# Patient Record
Sex: FEMALE | Race: WHITE
[De-identification: names, ages, dates, MRNs, and addresses within clinical notes are randomized per-mention and may not be internally consistent; named-entity substitution may affect disease eponyms.]

---

## 2020-06-11 ENCOUNTER — HOSPITAL ENCOUNTER (OUTPATIENT)
Dept: HOSPITAL 53 - M SLEEP HO | Age: 30
End: 2020-06-11
Attending: PHYSICIAN ASSISTANT
Payer: COMMERCIAL

## 2020-06-11 DIAGNOSIS — G47.00: ICD-10-CM

## 2020-06-11 DIAGNOSIS — R53.83: Primary | ICD-10-CM

## 2020-06-17 NOTE — SLEEPHOME
DATE OF PROCEDURE:  06/11/2020

 

ORDERED BY:  Ava Friend PA-C

 

Diagnostic home sleep testing was performed due to concern for the obstructive

sleep apnea syndrome in this patient with fatigue.

 

For testing a nocturnal T3 respiratory monitoring device was used.  Continuous

record was made of pulse, oxygen saturation, airflow, chest and abdominal strain,

and body position.

 

9 hours and 59 minutes of data were reviewed.  There were 8 hours and 59 minutes

marked as time in bed.  During the interval marked time in bed, there were 63

respiratory events identified of 10 seconds in duration or greater for a

respiratory event index of 70.  The events were not exclusive to any particular

sleep posture.  They were primarily obstructive.  Baseline pulse rate 75.  Pulse

rate ranged .  Baseline saturation was 95%.  Saturations fell to 90%

surrounding respiratory events.  Testing was performed in both the supine and

nonsupine positions.

 

IMPRESSION:

Abnormal home sleep testing with repetitive respiratory events and oxygen

desaturations to 90% with a respiratory event index of 7 is consistent with the

obstructive sleep apnea syndrome.

 

RECOMMENDATIONS:

The patient should be encouraged to undergo formal sleep evaluation.

## 2020-07-28 ENCOUNTER — HOSPITAL ENCOUNTER (EMERGENCY)
Dept: HOSPITAL 53 - M ED | Age: 30
Discharge: HOME | End: 2020-07-28
Payer: COMMERCIAL

## 2020-07-28 DIAGNOSIS — F41.9: ICD-10-CM

## 2020-07-28 DIAGNOSIS — Z97.5: ICD-10-CM

## 2020-07-28 DIAGNOSIS — Z79.899: ICD-10-CM

## 2020-07-28 DIAGNOSIS — R51: Primary | ICD-10-CM

## 2020-07-28 DIAGNOSIS — L30.9: ICD-10-CM

## 2020-09-18 NOTE — REP
CT OF THE HEAD WITHOUT CONTRAST: 



HISTORY:  Severe migraine headaches.



TECHNIQUE: Axial noncontrast images from the skull base to the vertex with 
coronal reformations. 



FINDINGS: 

The ventricles, sulci and cisterns are symmetric and normal. Gray-white 
differentiation is maintained. No acute intracranial hemorrhage, mass or mass 
effect. No extra-axial fluid collection. The calvarium is intact. The paranasal 
sinuses and mastoid air cells are clear. 



IMPRESSION: 

Negative noncontrast head CT. No evidence for acute intracranial pathology. 

MTDD

## 2021-01-13 ENCOUNTER — HOSPITAL ENCOUNTER (OUTPATIENT)
Dept: HOSPITAL 53 - M LABSMTC | Age: 31
End: 2021-01-13
Attending: PEDIATRICS
Payer: SELF-PAY

## 2021-01-13 DIAGNOSIS — Z20.822: Primary | ICD-10-CM

## 2021-12-28 ENCOUNTER — HOSPITAL ENCOUNTER (EMERGENCY)
Dept: HOSPITAL 53 - M ED | Age: 31
Discharge: HOME | End: 2021-12-28
Payer: COMMERCIAL

## 2021-12-28 VITALS — BODY MASS INDEX: 36.88 KG/M2 | WEIGHT: 200.42 LBS | HEIGHT: 62 IN

## 2021-12-28 VITALS — SYSTOLIC BLOOD PRESSURE: 134 MMHG | DIASTOLIC BLOOD PRESSURE: 71 MMHG

## 2021-12-28 DIAGNOSIS — K08.89: Primary | ICD-10-CM

## 2021-12-28 DIAGNOSIS — Z79.899: ICD-10-CM
